# Patient Record
(demographics unavailable — no encounter records)

---

## 2024-10-21 NOTE — DATA REVIEWED
[FreeTextEntry1] : Labs and chart notes reviewed today with patient -one of the markers of inflammation was a bit elevated, the other one was normal -the urine had bacteria and protein

## 2024-10-21 NOTE — HISTORY OF PRESENT ILLNESS
[de-identified] :  At today's visit. [FreeTextEntry1] : has been felling well, no joint pain or swelling no rashes no new complaints

## 2024-10-21 NOTE — HISTORY OF PRESENT ILLNESS
[de-identified] :  At today's visit. [FreeTextEntry1] : has been felling well, no joint pain or swelling no rashes no new complaints

## 2024-10-21 NOTE — ASSESSMENT
[FreeTextEntry1] : #Seropositive, nonerosive RA -Previously diagnosed with rheumatoid arthritis around 2 years ago, transiently on hydroxychloroquine, lost to follow-up -CCP 95, RF 79 ESR 41, CRP normal -US with trace fluid left wrist, no inflammatory signal Imaging June 2023 X-rays hands/wrists June 2023: no erosions *old distal left tibial fracture deformity , she had a fracture at age 12 X-rays of ankle/feet June 2023: no erosions  *Lost to follow up, stopped HCQ then resumed recently * Episode of pain/swelling at right foot as above High-Grade stress reaction and bursitis on MRI Seeing podiatrist , resolved now discussed the importance of adequate supportive shoe wear  Will obtain disease activity markers and inflammatory markers today Continue with  mg daily, to let me know if she looses more weight to reduce the dose to 200 mg daily most recent eye exam report from April 2024, cleared to continue  #Vitamin D insufficiency, resolved  continue supplementation  #Hypothyroidism, currently not on treatment advised to follow up with endo  #APOLONIA positive, rheumatologic ROS otherwise unrevealing SSA/SSB, Sm, RNP negative TPO + DsDNA 52, normal C' negative aPLs complement and dsDNA, urine studies- WNL obtain disease activity measures today and urine studies  RTO in 3-4 months

## 2025-02-10 NOTE — PHYSICAL EXAM

## 2025-02-10 NOTE — PHYSICAL EXAM

## 2025-02-10 NOTE — REVIEW OF SYSTEMS
[Chest Discomfort] : chest discomfort [Negative] : Heme/Lymph [FreeTextEntry5] : intermittent pinching sensation in chest x1 month and left arm heaviness.

## 2025-02-10 NOTE — HISTORY OF PRESENT ILLNESS
[FreeTextEntry1] : This is a 41 y.o. female with PMH of HLD and hypothyroidism who presents today for acute visit.  Pt. reported intermittent pinching sensation in chest x 1 month. Also reported left arm heaviness and left upper back discomfort for the past 2 weeks.  Denies any chest pain, sob, palpitations, lightheadedness/ dizziness, slurred speech, headache or other concerning symptoms

## 2025-02-18 NOTE — DATA REVIEWED
[FreeTextEntry1] : Labs and chart notes reviewed today with patient  elevated CRP (20) routine labs normal  NST normal per patient

## 2025-02-18 NOTE — HISTORY OF PRESENT ILLNESS
[de-identified] : Last seen October 2024, at today's visit  [FreeTextEntry1] : arthritis wise she feels very well, no joint pain or swelling, no morning stiffness had episodes of transient chest pain, undergoing cardiac work up so far negative

## 2025-02-18 NOTE — ASSESSMENT
[FreeTextEntry1] : #Seropositive, nonerosive RA -Previously diagnosed with rheumatoid arthritis around 2 years ago, transiently on hydroxychloroquine, lost to follow-up -CCP 95, RF 79 ESR 41, CRP normal -US with trace fluid left wrist, no inflammatory signal Imaging June 2023 X-rays hands/wrists June 2023: no erosions *old distal left tibial fracture deformity , she had a fracture at age 12 X-rays of ankle/feet June 2023: no erosions  *Lost to follow up, stopped HCQ then resumed recently ([id number]) * Episode of pain/swelling at right foot as above High-Grade stress reaction and bursitis on MRI Seeing podiatrist , resolved now   Monitoring labs obtained 2/10/25 reviewed with patient today: normal limits, CRP elevated at 20 and ESR 39 though she has no joint symptoms at all options reviewed with patient and she would rather monitor clinically for now without stepping up therapy Continue with  mg daily, to let me know if she looses more weight to reduce the dose to 200 mg daily most recent eye exam report from April 2024, cleared to continue plan to repeat labs in April and imaging in June  # Chest pain, transient episodes undergoing work up with cardiology, so far negative pending CXR and TTE will follow up to r.o serositis (though symptoms do not appear related)   #Vitamin D insufficiency, resolved continue supplementation  #Hypothyroidism, currently not on treatment advised to follow up with endo  #APOLONIA positive, rheumatologic ROS otherwise unrevealing SSA/SSB, Sm, RNP negative TPO + DsDNA 52, normal C' negative aPLs complement and dsDNA, urine studies- WNL   RTO in 2 months or earlier if needed.